# Patient Record
Sex: FEMALE | Race: BLACK OR AFRICAN AMERICAN
[De-identification: names, ages, dates, MRNs, and addresses within clinical notes are randomized per-mention and may not be internally consistent; named-entity substitution may affect disease eponyms.]

---

## 2019-11-12 ENCOUNTER — HOSPITAL ENCOUNTER (EMERGENCY)
Dept: HOSPITAL 74 - JER | Age: 69
Discharge: ANOTHER HOSPITAL | End: 2019-11-12
Payer: COMMERCIAL

## 2019-11-12 VITALS — TEMPERATURE: 98 F | HEART RATE: 114 BPM | DIASTOLIC BLOOD PRESSURE: 104 MMHG | SYSTOLIC BLOOD PRESSURE: 159 MMHG

## 2019-11-12 VITALS — BODY MASS INDEX: 41.1 KG/M2

## 2019-11-12 DIAGNOSIS — Z90.722: ICD-10-CM

## 2019-11-12 DIAGNOSIS — Z79.4: ICD-10-CM

## 2019-11-12 DIAGNOSIS — E78.5: ICD-10-CM

## 2019-11-12 DIAGNOSIS — R47.81: ICD-10-CM

## 2019-11-12 DIAGNOSIS — Z90.49: ICD-10-CM
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
## 2019-11-12 DIAGNOSIS — I63.9: Primary | ICD-10-CM

## 2019-11-12 DIAGNOSIS — Z90.710: ICD-10-CM
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
## 2019-11-12 DIAGNOSIS — R29.810: ICD-10-CM
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
## 2019-11-12 DIAGNOSIS — Z85.43: ICD-10-CM

## 2019-11-12 DIAGNOSIS — I10: ICD-10-CM

## 2019-11-12 DIAGNOSIS — E10.9: ICD-10-CM

## 2019-11-12 DIAGNOSIS — G81.94: ICD-10-CM

## 2019-11-12 DIAGNOSIS — Z85.038: ICD-10-CM
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
## 2019-11-12 DIAGNOSIS — Z72.0: ICD-10-CM

## 2019-11-12 DIAGNOSIS — Z90.79: ICD-10-CM

## 2019-11-12 LAB
ALBUMIN SERPL-MCNC: 3.8 G/DL (ref 3.4–5)
ALP SERPL-CCNC: 141 U/L (ref 45–117)
ALT SERPL-CCNC: 36 U/L (ref 13–61)
ANION GAP SERPL CALC-SCNC: 9 MMOL/L (ref 8–16)
APPEARANCE UR: (no result)
APTT BLD: 29.2 SECONDS (ref 25.2–36.5)
AST SERPL-CCNC: 20 U/L (ref 15–37)
BACTERIA # UR AUTO: 136.6 /HPF
BASOPHILS # BLD: 1 % (ref 0–2)
BILIRUB SERPL-MCNC: 0.5 MG/DL (ref 0.2–1)
BILIRUB UR STRIP.AUTO-MCNC: NEGATIVE MG/DL
BUN SERPL-MCNC: 23.9 MG/DL (ref 7–18)
CALCIUM SERPL-MCNC: 10.1 MG/DL (ref 8.5–10.1)
CASTS URNS QL MICRO: 4 /LPF (ref 0–8)
CHLORIDE SERPL-SCNC: 110 MMOL/L (ref 98–107)
CHOLEST SERPL-MCNC: 203 MG/DL (ref 50–200)
CO2 SERPL-SCNC: 20 MMOL/L (ref 21–32)
COLOR UR: YELLOW
CREAT SERPL-MCNC: 1.9 MG/DL (ref 0.55–1.3)
CRYSTALS URNS QL MICRO: (no result) /HPF
DEPRECATED RDW RBC AUTO: 14.2 % (ref 11.6–15.6)
EOSINOPHIL # BLD: 2.9 % (ref 0–4.5)
EPITH CASTS URNS QL MICRO: 5.4 /HPF
GLUCOSE SERPL-MCNC: 227 MG/DL (ref 74–106)
HCT VFR BLD CALC: 38.9 % (ref 32.4–45.2)
HDLC SERPL-MCNC: 67 MG/DL (ref 40–60)
HGB BLD-MCNC: 12.4 GM/DL (ref 10.7–15.3)
INR BLD: 0.95 (ref 0.83–1.09)
KETONES UR QL STRIP: NEGATIVE
LDLC SERPL CALC-MCNC: 102 MG/DL (ref 5–100)
LEUKOCYTE ESTERASE UR QL STRIP.AUTO: NEGATIVE
LYMPHOCYTES # BLD: 13 % (ref 8–40)
MCH RBC QN AUTO: 30.4 PG (ref 25.7–33.7)
MCHC RBC AUTO-ENTMCNC: 32 G/DL (ref 32–36)
MCV RBC: 95.1 FL (ref 80–96)
MONOCYTES # BLD AUTO: 5.4 % (ref 3.8–10.2)
NEUTROPHILS # BLD: 77.7 % (ref 42.8–82.8)
NITRITE UR QL STRIP: NEGATIVE
PH UR: 5 [PH] (ref 5–8)
PLATELET # BLD AUTO: 143 K/MM3 (ref 134–434)
PMV BLD: 10.6 FL (ref 7.5–11.1)
POTASSIUM SERPLBLD-SCNC: 4.1 MMOL/L (ref 3.5–5.1)
PROT SERPL-MCNC: 7.7 G/DL (ref 6.4–8.2)
PROT UR QL STRIP: (no result)
PROT UR QL STRIP: (no result)
PT PNL PPP: 11.2 SEC (ref 9.7–13)
RBC # BLD AUTO: 2 /HPF (ref 0–4)
RBC # BLD AUTO: 4.09 M/MM3 (ref 3.6–5.2)
SODIUM SERPL-SCNC: 139 MMOL/L (ref 136–145)
SP GR UR: 1.02 (ref 1.01–1.03)
TRIGL SERPL-MCNC: 274 MG/DL (ref 0–150)
UROBILINOGEN UR STRIP-MCNC: 0.2 MG/DL (ref 0.2–1)
WBC # BLD AUTO: 11.7 K/MM3 (ref 4–10)
WBC # UR AUTO: 5 /HPF (ref 0–5)

## 2019-11-12 NOTE — PDOC
Documentation entered by Rigo Gonzalez SCRIBE, acting as scribe for Zeke Paetl MD.








Zeke Patel MD:  This documentation has been prepared by the Carlos plascencia Xhesika, SCRIBE, under my direction and personally reviewed by me in its 

entirety.  I confirm that the documentation accurately reflects all work, 

treatment, procedures, and medical decision making performed by me.  





Attending Attestation





- Resident


Resident Name: Angeles Kelley





- ED Attending Attestation


I have performed the following: I have examined & evaluated the patient, The 

case was reviewed & discussed with the resident, I agree w/resident's findings 

& plan, Exceptions are as noted





- HPI


HPI: 





11/12/19 12:29


The patient is a 69 year old female with a significant PMH of HTN, HLD, DM, 

diverticulitis, and colon ca who presents to the emergency department from Dr. Harper office for L sided facial droop, L arm weakness and slurred speech. The 

patient states she woke up at 6am today she had breakfast, was walking normally 

and felt fine. [id number] patient felt weak, had some shortness of breath/

wheezing, fell and had difficulty getting up. Patient notes she was on her way 

to the cardiology clinic when her symptoms worsened.  Patient also endorses 

intermittent episodes of "wheezing/sob" last couple days





The patient denies chest pain, palpitations, vision changes,  headache and 

dizziness. Denies fever, chills, cough, nausea, vomiting, diarrhea and 

constipation. Denies dysuria, frequency, urgency and hematuria.





Allergies: NKDA


Neurologist: Dr. Howell 











- Physicial Exam


PE: 





11/12/19 12:30


GENERAL: The patient is awake, alert, and fully oriented, Nontoxic - in no 

acute distress.


HEAD: Normocephalic, atraumatic.


EYES: extraocular movements intact, sclera anicteric, conjunctiva clear.


ENT: Normal voice,  Moist mucous membranes.


NECK: Normal range of motion, supple without lymphadenopathy, JVD, or masses.


LUNGS: Breath sounds equal, clear to auscultation bilaterally.  No wheezes, no 

crackles, no rales.


HEART: tachycardic


ABDOMEN: Soft, nontender, normoactive bowel sounds.  No guarding, no rebound.  

No masses.


EXTREMITIES: Normal range of motion, no edema.  No clubbing or cyanosis. No 

cords, erythema, or tenderness.


NEUROLOGICAL: L facial weakness, L arm drift, L leg drift, finger to nose/rapid 

alternaing movements intct, visual fields intact, EOMI, 


PSYCH: Normal mood, normal affect.


SKIN: Warm, Dry, normal turgor, no rashes or lesions noted.











- Critical Care Time


Total Critical Care Time: 35


Critical Care Statement: The care of this patient involved high complexity 

decision making to prevent further life threatening deterioration of the patient

's condition and/or to evaluate & treat vital organ system(s) failure or risk 

of failure.





- Medical Decision Making





11/12/19 12:39


69-year-old presenting with left-sided weakness, slurred speech, time of onset 

seems to be approximately 7 AM as the patient did fall and was able to get up -

after getting up and having breakfast and feeling well. 





On arrival the patient does have slurred speech and very mild left facial droop 

as well as upper extremity and lower extremity weakness. 


NIHSS = 6


Patient is outside the window for TPA


BGM was 207





Awaiting CT of the head, EKG to screen for arrhythmias


Case was discussed with neurology (génesis)


11/12/19 13:04


ekg shows sinus tach here


Patient's BP stabilized


The patient CT was negative for acute bleed we will give the patient aspirin 

will discuss with Dr. Mcgarry will obtain CT a to rule out MCA stroke





11/12/19 13:30





Case was discussed with Dr. Mcgarry, would defer the CTA here would transfer to 

[facility] for further management


The patient's creatinine is elevated from baseline 1.9, troponin is at 0.3. 


Dr. Mcgarry recommends holding off on heparin at this time





Patient is slightly hypoxic at 94 over her c lungs are clear, the patient was 

placed on supplemental oxygen she is satting 98% on 2 L


11/12/19 14:00








**Heart Score/ECG Review





- ECG Impressions


Comment:: 





11/12/19 12:40


Twelve-lead EKG was performed and reviewed by me. 


There is normal sinus rhythm with a rate of 114


Left anterior fascicular block





Impression: Sinus tachycardia

## 2019-11-12 NOTE — PDOC
History of Present Illness





- General


Stated Complaint: CVA/TIA


Time Seen by Provider: 11/12/19 12:19





- History of Present Illness


Initial Comments: 





11/12/19 12:25


HPI: 


68 y/o F with hx of HTN, HLD, IDDM1, meningioma (on recent MRI), ovarian CA s/p 

hysterectomy and BSO, colon CA s/p partial colectomy presenting from Dr Harper's 

clinic for ruleout stroke. Patient was in her normal state of health at 6am but 

around 7-8am she felt weakness in her BL legs and had a fall without syncope or 

LOC. She states she was on the ground for 15-20min because she felt too weak to 

get up. When she was able to get up she felt weakness in her arms as well as 

dysphagia and wasnt able to swallow any liquids. During this time she reports 

LH and SOB. She denies chest pain, abd pain, diaphoresis, n/v, incontinence





PMHx: as noted above


ROS: as noted


SHx: Denies tobacco use; no alcohol use; no rec drugs


Allergies: penicillin -> anaphylaxis








ROS: 


GENERAL/CONSTITUTIONAL: No fever or chills. No weakness.


HEAD, EYES, EARS, NOSE AND THROAT: No change in vision. No ear pain or 

discharge. No sore throat.


CARDIOVASCULAR: +shortness of breath


RESPIRATORY: No cough, wheezing, or hemoptysis.


GASTROINTESTINAL: No nausea, vomiting, diarrhea or constipation.


GENITOURINARY: No dysuria, frequency, or change in urination.


MUSCULOSKELETAL: No joint or muscle swelling or pain. No neck or back pain.


SKIN: No rash


NEUROLOGIC: No headache, vertigo, loss of consciousness; +change in strength/

sensation.


ENDOCRINE: No increased thirst. No abnormal weight change


HEMATOLOGIC/LYMPHATIC: No anemia, easy bleeding, or history of blood clots.


ALLERGIC/IMMUNOLOGIC: No hives or skin allergy.








PE: 


GENERAL: Awake, alert, and fully oriented, no acute distress


HEAD: No signs of trauma, normocephalic, atraumatic 


EYES: EOMI, sclera anicteric, conjunctiva clear


ENT: Auricles normal inspection, hearing grossly normal, nares patent, 

oropharynx clear without exudates. Moist mucosa


NECK: Normal ROM, no lymphadenopathy


LUNGS: No increased work of breathing, symmetrical chest rise, clear to 

auscultation bilaterally, no wheezes, crackles or rhonchi 


HEART: tachycardia regular rhythm, normal S1 and S2, no murmurs, peripheral 

pulses 2+ and equal bilaterally. 


ABDOMEN: Soft, nondistended, nontender, normoactive bowel sounds. No guarding, 

no rebound. No masses. No CVAT


EXTREMITIES: Normal inspection, Normal range of motion, no edema. No clubbing 

or cyanosis. 


NEUROLOGICAL: left sided upper and lower droop, facial sensation intact, left U/

L extremities 4/5 str, right side 5/5, no ataxia, mild dysarthria, gait 

deferred 


SKIN: Warm, Dry, normal turgor, no rashes or lesions noted








tPA Exclusion checklist 3-4.5h





- Time Elapsed


Date last known well: 11/12/19


Time last known well: 07:00


Elaspsed time:  Day(s) and 6 Hour(s) and 1 Minutes 





- Thrombolytic Therapy Candidate


Is patient eligible for thrombolytic therapy: No





- Exclusion Criteria 3-4.5 hr


SBP greater than 185 or DBP greater than 110mmHg despite tx: No


Recent IC/spinal surgery,head trauma or stroke<3mos.: No


Hx IC hemorrhage, IC neoplasm, AV malformation or aneurysm: No


Active internal bleeding: No


Blding diathesis(low plt ct, inc PTT,INR>1.7 or use of NOAC): No


Symptoms suggest subarachnoid hemorrhage: No


CT demonstrates multilobar infarct(>1/3 cerebral hemiphere): No


Arterial puncture at noncompressible site in previous 7 days: No


Blood glucose concentration less than 50mg/dL (2.7mmol/L): No





- Relative Exclusion Criteria 3-4.5 hr


Life expectancy <1 yr or severe co-morbid illness: No


Pregnancy: No


Patient/family refused: No


Rapid improvement: No


Stroke severity too mild: No


Recent acute MI (w/in previous 3 months): No


Seizure at onset with postictal residual neuro impairments: No


Major surgery or serious trauma w/in previous 14 days: No


Recent GI or  hemorrhage (w/in previous 21 days): No





- Add'l Relative Exclusion 3-4.5 hr


Age > 80: No


Hx of both diabetes AND prior ischemic stroke: No


Taking an oral anticoagulant regardless of INR: No


NIHSS >25: No





- Ineligibility reason(s)


Reasons No tPA given: Outside of window - delayed arrival





NIH Stroke Scale





- Last Known Well Date/Time & Onset


Date Last Known Well: 11/12/19


Time Last Known Well: 07:00





- Initial Evaluation


Level of consciousness: Alert


Ask patient the month and their age: Answers both correctly


Ask patient to open & close eyes; make fist and let go: Obeys both correctly


Best gaze (horizontal eye movement): Normal


Visual field testing: No visual field loss


Facial paresis (Show teeth/raise eyebrows/close eyes tight): Complete paralysis 

of one or both sides (Upper and lower face)


Motor Function: Left Arm: Drift


Motor Function:  Right Arm: Normal (extends arm 90 (or 45) degrees for 10 

seconds without drift


Motor Function:  Left Leg: Drift


Motor Function:  Right Leg: Normal (extends leg 30 degrees for 5 seconds 

without drift)


Limb Ataxia: No ataxia


Sensory(Use pinprick test arms,legs,trunk,face/side to side): Normal


Best language (Describe picture, name items, read sentences): No Aphasia


Dysarthria (read several words): Mild to moderate slurring of words


Extinction and Inattention: No abnormality





- Total Score


NIH Stroke Scale Score: 6





Past History





- Past Medical History


Allergies/Adverse Reactions: 


 Allergies











Allergy/AdvReac Type Severity Reaction Status Date / Time


 


codeine [Codeine] Allergy   Verified 11/12/19 12:34


 


Penicillins Allergy   Verified 11/12/19 12:34











Home Medications: 


Ambulatory Orders





Diltiazem Cd [Cardizem Cd -] 240 mg PO DAILY 04/29/15 


Fluticasone Propionate [Flovent Diskus] 50 mcg IH 04/29/15 


Losartan Potassium 50 mg PO DAILY 04/29/15 


Montelukast Na [Singulair -] 10 mg PO HS 04/29/15 


Allopurinol [Zyloprim -] 100 mg PO DAILY 11/12/19 


Aspirin [ASA -] 81 mg PO DAILY 11/12/19 


Atorvastatin Ca [Lipitor] 40 mg PO HS 11/12/19 


Carvedilol 25 mg PO DAILY 11/12/19 


Dulaglutide [Trulicity] 0.75 mg SQ WEEKLY 11/12/19 


Latanoprost 0.005% Eye Drops [Xalatan 0.005% Eye Drops -] 1 drop OU HS 11/12/19 








Anemia: No


Asthma: No


Cancer: Yes (HX.COLON CANCER)


Cardiac Disorders: No


CVA: No


COPD: No


CHF: No


Dementia: No


Diabetes: Yes


GI Disorders: Yes (CONSTIPATION,COLON CANCER,DIVERTICULOSIS)


 Disorders: No


HTN: Yes


Hypercholesterolemia: Yes


Liver Disease: No


Seizures: No


Thyroid Disease: No





- Surgical History


Abdominal Surgery: Yes (LAP BNANDING,UMBILICAL HERNIA)


Appendectomy: No


Cardiac Surgery: No


Cholecystectomy: Yes


Lung Surgery: No


Neurologic Surgery: No


Orthopedic Surgery: Yes (ROTATOR CUFF RIGHT,ARTHROSCOPY R)





- Psycho Social/Smoking Cessation Hx


Smoking History: Current some day smoker


Have you smoked in the past 12 months: Yes


Hx Alcohol Use: No


Drug/Substance Use Hx: No


Substance Use Type: None


Hx Substance Use Treatment: No





ED Treatment Course





- LABORATORY


CBC & Chemistry Diagram: 


 11/12/19 12:20





 11/12/19 12:20





- ADDITIONAL ORDERS


Additional order review: 


 Laboratory  Results











  11/12/19





  12:14


 


POC Glucometer  208








 











  11/12/19





  12:14


 


POC Glucometer  208














Medical Decision Making





- Medical Decision Making





11/12/19 13:43


68 y/o F with hx of HTN, HLD, IDDM1, meningioma (on recent MRI), ovarian CA s/p 

hysterectomy and BSO, colon CA s/p partial colectomy presenting from Dr Harper's 

clinic for ruleout stroke with left sided weakness and left facial droop. 

Initial /121 with  and improvement without intervention to /

102 PE notable for NIHSS 6, dysarthria, slurred speech, left sided facial droop

, LUE/LLE 4/5 str. 





-stroke order set





11/12/19 14:14


Cr 1.9


trop 0.31


WBC 11.7


ekg sinus tach


cth: no acute bleed; stable meningioma from last mri; chronic infarcts


discussed with Dr Howell; given Cr 1.9 will transfer to [facility] for fruther 

eval; not amenable to CTA


Spoke to Dr Pacheco (neuro) and Dr Box (ED); transfer accepted


patient received 324 ASA per rectum


ivf


Transfer and arrived; neuro team wanted HR below 110 before transfer; most 

recent 





Discharge





- Discharge Information


Problems reviewed: Yes


Clinical Impression/Diagnosis: 


CVA (cerebral vascular accident)


Qualifiers:


 CVA mechanism: unspecified Qualified Code(s): I63.9 - Cerebral infarction, 

unspecified





Condition: Stable


Disposition: TRANSFER ACUTE CARE/OTHER HOSP





- Follow up/Referral





- Patient Discharge Instructions





- Post Discharge Activity





- Transfer to Acute Care Facility


Receiving Facility Name: [facility]


Accepting Physician:: Shiraz Box (ED) and Scott Pacheco (stroke attending)

## 2019-11-12 NOTE — CON.NEURO
Consult


Consult Specialty:: Lynda


Referred by:: ER


Reason for Consultation:: CVA





- History of Present Illness


History of Present Illness: 





69-year-old right-handed -American gentleman with present medical 

history significant for


Coronary artery disease


Carpal tunnel syndrome


Hypertension


Headache


postconcussion syndrome


Chronic low back pain


diabetes


Patient presented to the emergency room at [facility] with 

acute onset of difficulty withspeech facial droopiness on the left side 

according to the patient she was last known normal at 7:00 this morning.  No 

report of any recent travel no report of any recent head trauma.  Unfortunately 

the patient was fell down according to her and she was able to manage got up 

went to the cardiologist patient was found with slurred speech facial numbness 

patient was told to go to the emergency room at [facility].  

Stroke protocol was initiated CAT scan of the head revealed no evidence of 

acute pathology patient was not able to get the CT angiogram due to high 

creatinine.  Patient was stepwise in the emergency room patient failed the 

dysphagia sit test  In the emergency room patient did not improve patient 

denies stroke scale was 5





- History Source


History Provided By: Patient


Limitations to Obtaining History: Clinical Condition





- Alcohol/Substance Use


Hx Alcohol Use: No





- Smoking History


Smoking history: Current some day smoker


Have you smoked in the past 12 months: Yes





Home Medications





- Allergies


Allergies/Adverse Reactions: 


 Allergies











Allergy/AdvReac Type Severity Reaction Status Date / Time


 


codeine [Codeine] Allergy   Verified 11/12/19 12:34


 


Penicillins Allergy   Verified 11/12/19 12:34














- Home Medications


Home Medications: 


Ambulatory Orders





Diltiazem Cd [Cardizem Cd -] 240 mg PO DAILY 04/29/15 


Fluticasone Propionate [Flovent Diskus] 50 mcg IH DAILY 04/29/15 


Losartan Potassium 50 mg PO DAILY 04/29/15 


Montelukast Na [Singulair -] 10 mg PO HS 04/29/15 


Allopurinol [Zyloprim -] 100 mg PO DAILY 11/12/19 


Aspirin [ASA -] 81 mg PO DAILY 11/12/19 


Atorvastatin Ca [Lipitor] 40 mg PO HS 11/12/19 


Carvedilol 25 mg PO DAILY 11/12/19 


Dulaglutide [Trulicity] 0.75 mg SQ WEEKLY 11/12/19 


Latanoprost 0.005% Eye Drops [Xalatan 0.005% Eye Drops -] 1 drop OU HS 11/12/19 











Family Medical History


Family History: Unable to Obtain





Review of Systems





- Review of Systems


Constitutional: reports: No Symptoms


Eyes: reports: No Symptoms





Physical Exam-Neuro


Vital Signs: 


 Vital Signs











Temperature  98.9 F   11/12/19 12:10


 


Pulse Rate  115 H  11/12/19 12:20


 


Respiratory Rate  26 H  11/12/19 12:10


 


Blood Pressure  208/121 H  11/12/19 12:10


 


O2 Sat by Pulse Oximetry (%)  98   11/12/19 12:20











Labs: 


 CBC, BMP





 11/12/19 12:20 





 11/12/19 12:20 





 INR, PTT











INR  0.95  (0.83-1.09)   11/12/19  12:20    














NIH Stroke Scale





- Last Known Well Date/Time & Onset


Date Last Known Well: 11/12/19


Time Last Known Well: 07:00





- Initial Evaluation


Level of consciousness: Alert


Ask patient the month and their age: Answers both correctly


Ask patient to open & close eyes; make fist and let go: Obeys both correctly


Best gaze (horizontal eye movement): Normal


Visual field testing: No visual field loss


Facial paresis (Show teeth/raise eyebrows/close eyes tight): Minor paralysis (

flattened nasolabial fold, asymmetry on smiling)


Motor Function: Left Arm: Drift


Motor Function:  Right Arm: Normal (extends arm 90 (or 45) degrees for 10 

seconds without drift


Motor Function:  Left Leg: Drift


Motor Function:  Right Leg: Normal (extends leg 30 degrees for 5 seconds 

without drift)


Limb Ataxia: Present in one limb


Sensory(Use pinprick test arms,legs,trunk,face/side to side): Mild to moderate 

decrease in sensation


Best language (Describe picture, name items, read sentences): No Aphasia


Dysarthria (read several words): Mild to moderate slurring of words


Extinction and Inattention: Inattention or extinction bilaterally to one of the 

sensory modalities





- Total Score


NIH Stroke Scale Score: 7





Imaging





- Results


Cat Scan: Image Reviewed





Problem List





- Problems


(1) CVA (cerebral vascular accident)


Assessment/Plan: 


acute right middle cerebral arteryinfarct


 risk factors are hypertension diabetes


patient unfortunately is outside the window for the intravenous thrombolysis


Patient unfortunately with high creatinine level that he cannot do CT angiogram


I prefer the patient to go right now to the emergency room at [facility] to be transferred to the interventional radiology for thrombectomy.


Nothing by mouth.


Neuro checks every 1 hour.


MRI of the brain with no contrast.


MRA of the brain with no contrast.


Aspirin per rectum 


Code(s): I63.9 - CEREBRAL INFARCTION, UNSPECIFIED

## 2019-11-13 NOTE — EKG
Test Reason : 

Blood Pressure : ***/*** mmHG

Vent. Rate : 114 BPM     Atrial Rate : 114 BPM

   P-R Int : 174 ms          QRS Dur : 094 ms

    QT Int : 340 ms       P-R-T Axes : 048 -71 050 degrees

   QTc Int : 468 ms

 

*** POOR DATA QUALITY, INTERPRETATION MAY BE ADVERSELY AFFECTED

SINUS TACHYCARDIA

POSSIBLE LEFT ATRIAL ENLARGEMENT

LEFT ANTERIOR FASCICULAR BLOCK

ABNORMAL ECG

WHEN COMPARED WITH ECG OF 18-JUL-2007 21:28,

NO SIGNIFICANT CHANGE WAS FOUND

Confirmed by DIONISIO CHAVIS MD ([id number]) on 11/13/2019 12:17:28 PM

 

Referred By:             Confirmed By:DIONISIO CHAVIS MD